# Patient Record
Sex: MALE | Race: WHITE | URBAN - METROPOLITAN AREA
[De-identification: names, ages, dates, MRNs, and addresses within clinical notes are randomized per-mention and may not be internally consistent; named-entity substitution may affect disease eponyms.]

---

## 2022-02-07 ENCOUNTER — TELEPHONE (OUTPATIENT)
Dept: RADIATION ONCOLOGY | Facility: CLINIC | Age: 77
End: 2022-02-07

## 2022-02-07 NOTE — TELEPHONE ENCOUNTER
Spoke with patient this morning  History of Joseph 6 adenocarcinoma of the prostate treated at [facility] with IMRT completed in 2002.  Currently the patient is living in [place], [place].  Recently noted to have an increasing PSA 3.2 ng/ml.  MRI in November of 2021 revealed 32 cc prostate with PI-RADS 4 lesion, no extraprostatic extension or seminal vesicle invasion.   Subsequent biopsies revealed GG 5 adenocarcinoma involving 10 of 12 cores.      Discussed his presentation and previous treatment.  Explained he appears to have developed a new prostate cancer.  Discussed further work with Bone scan and / or F18 PSMA pet scan.  Discussed possible further therapy with hormonal deprivation and possible further local radiotherapy depending on his metastatic work up.  He will continue work and treatment in [place].      Encouraged him to contact our office with any further questions or concerns.

## 2022-02-07 NOTE — TELEPHONE ENCOUNTER
----- Message from Geno Godoy sent at 2/4/2022  1:41 PM CST -----  Regarding: Previous Patient  Pt reached out today to inform you that he has cancer again. He wanted to share his progress with you and get your opinion since you were his doctor the last time he had cancer. Please call him back at [phone].  Nasima